# Patient Record
Sex: MALE | Race: WHITE | NOT HISPANIC OR LATINO | Employment: OTHER | URBAN - METROPOLITAN AREA
[De-identification: names, ages, dates, MRNs, and addresses within clinical notes are randomized per-mention and may not be internally consistent; named-entity substitution may affect disease eponyms.]

---

## 2020-01-09 ENCOUNTER — OFFICE VISIT CONVERTED (OUTPATIENT)
Dept: ORTHOPEDIC SURGERY | Facility: CLINIC | Age: 20
End: 2020-01-09
Attending: ORTHOPAEDIC SURGERY

## 2020-02-10 ENCOUNTER — HOSPITAL ENCOUNTER (OUTPATIENT)
Dept: PERIOP | Facility: HOSPITAL | Age: 20
Setting detail: HOSPITAL OUTPATIENT SURGERY
Discharge: HOME | End: 2020-02-10
Attending: ORTHOPAEDIC SURGERY

## 2020-02-25 ENCOUNTER — OFFICE VISIT CONVERTED (OUTPATIENT)
Dept: ORTHOPEDIC SURGERY | Facility: CLINIC | Age: 20
End: 2020-02-25
Attending: ORTHOPAEDIC SURGERY

## 2020-04-23 ENCOUNTER — OFFICE VISIT CONVERTED (OUTPATIENT)
Dept: ORTHOPEDIC SURGERY | Facility: CLINIC | Age: 20
End: 2020-04-23
Attending: ORTHOPAEDIC SURGERY

## 2020-07-23 ENCOUNTER — OFFICE VISIT CONVERTED (OUTPATIENT)
Dept: ORTHOPEDIC SURGERY | Facility: CLINIC | Age: 20
End: 2020-07-23
Attending: PHYSICIAN ASSISTANT

## 2020-08-20 ENCOUNTER — OFFICE VISIT CONVERTED (OUTPATIENT)
Dept: ORTHOPEDIC SURGERY | Facility: CLINIC | Age: 20
End: 2020-08-20
Attending: ORTHOPAEDIC SURGERY

## 2020-08-20 ENCOUNTER — CONVERSION ENCOUNTER (OUTPATIENT)
Dept: ORTHOPEDIC SURGERY | Facility: CLINIC | Age: 20
End: 2020-08-20

## 2020-09-14 ENCOUNTER — HOSPITAL ENCOUNTER (OUTPATIENT)
Dept: MRI IMAGING | Facility: HOSPITAL | Age: 20
Discharge: HOME | End: 2020-09-14
Attending: ORTHOPAEDIC SURGERY

## 2020-09-22 ENCOUNTER — OFFICE VISIT CONVERTED (OUTPATIENT)
Dept: ORTHOPEDIC SURGERY | Facility: CLINIC | Age: 20
End: 2020-09-22
Attending: ORTHOPAEDIC SURGERY

## 2020-09-22 ENCOUNTER — CONVERSION ENCOUNTER (OUTPATIENT)
Dept: ORTHOPEDIC SURGERY | Facility: CLINIC | Age: 20
End: 2020-09-22

## 2020-11-27 ENCOUNTER — HOSPITAL ENCOUNTER (OUTPATIENT)
Dept: PREADMISSION TESTING | Facility: HOSPITAL | Age: 20
Discharge: HOME | End: 2020-11-27
Attending: ORTHOPAEDIC SURGERY

## 2020-11-28 LAB — SARS-COV-2 RNA SPEC QL NAA+PROBE: NOT DETECTED

## 2020-11-30 ENCOUNTER — HOSPITAL ENCOUNTER (OUTPATIENT)
Dept: PERIOP | Facility: HOSPITAL | Age: 20
Setting detail: HOSPITAL OUTPATIENT SURGERY
Discharge: HOME | End: 2020-11-30
Attending: ORTHOPAEDIC SURGERY

## 2020-12-11 ENCOUNTER — CONVERSION ENCOUNTER (OUTPATIENT)
Dept: ORTHOPEDIC SURGERY | Facility: CLINIC | Age: 20
End: 2020-12-11

## 2020-12-11 ENCOUNTER — OFFICE VISIT CONVERTED (OUTPATIENT)
Dept: ORTHOPEDIC SURGERY | Facility: CLINIC | Age: 20
End: 2020-12-11
Attending: ORTHOPAEDIC SURGERY

## 2021-05-12 NOTE — PROGRESS NOTES
Progress Note      Patient Name: Harry Madden   Patient ID: [id number]   Sex: Male   YOB: 2000        Visit Date: April 23, 2020    Provider: Fco Hawkins MD   Location: [facility]   Location Address: [address] [place] [address]  [id number]   Location Phone: [phone]          Chief Complaint  · S/P Left Knee ACL Reconstruction and Meniscus Repair      History Of Present Illness  Harry Madden is a 19 year old /White male who presents today to [facility].      Patient is post-op left knee arthroscopic ACL reconstruction and medial meniscus repair performed on 2/10/2020. Patient states he had no physical therapy for 3 weeks because of COVID-19, but he did start back earlier this week. Patient states he discontinued knee brace on Monday. Denies locking or instability. Patient states he has occasional left knee popping that can be painful as well as lateral left knee tenderness.       Past Medical History  Asthma         Past Surgical History  I have had no surgeries         Medication List  naproxen 250 mg oral tablet         Allergy List  NO KNOWN DRUG ALLERGIES; NO KNOWN DRUG ALLERGIES         Family Medical History  *No Known Family History         Social History  Alcohol Use (Current some day); .; lives with other; Recreational Drug Use (Never); Tobacco (Former); Working         Review of Systems  · Constitutional  o Denies  o : fever, chills, weight loss  · Cardiovascular  o Denies  o : chest pain, shortness of breath  · Gastrointestinal  o Admits  o : heartburn  o Denies  o : liver disease, nausea, blood in stools  · Genitourinary  o Denies  o : painful urination, blood in urine  · Integument  o Denies  o : rash, itching  · Neurologic  o Denies  o : headache, weakness, loss of consciousness  · Musculoskeletal  o Denies  o : painful, swollen joints  · Psychiatric  o Denies  o : drug/alcohol addiction, anxiety,  "depression      Vitals  Date Time BP Position Site L\R Cuff Size HR RR TEMP (F) WT  HT  BMI kg/m2 BSA m2 O2 Sat HC       04/23/2020 10:51 AM         199lbs 0oz 6'  1\" 26.25 2.16           Physical Examination  · Constitutional  o Appearance  o : well developed, well-nourished, no obvious deformities present  · Head and Face  o Head  o :   § Inspection  § : normocephalic  o Face  o :   § Inspection  § : no facial lesions  · Eyes  o Conjunctivae  o : conjunctivae normal  o Sclerae  o : sclerae white  · Ears, Nose, Mouth and Throat  o Ears  o :   § External Ears  § : appearance within normal limits  § Hearing  § : intact  o Nose  o :   § External Nose  § : appearance normal  · Neck  o Inspection/Palpation  o : normal appearance  o Range of Motion  o : full range of motion  · Respiratory  o Respiratory Effort  o : breathing unlabored  o Inspection of Chest  o : normal appearance  o Auscultation of Lungs  o : no audible wheezing or rales  · Cardiovascular  o Heart  o : regular rate  · Gastrointestinal  o Abdominal Examination  o : soft and non-tender  · Skin and Subcutaneous Tissue  o General Inspection  o : intact, no rashes  · Psychiatric  o General  o : Alert and oriented x3  o Judgement and Insight  o : judgment and insight intact  o Mood and Affect  o : mood normal, affect appropriate  · Left Knee  o Inspection  o : Well-healed scars. No signs of infection. Range of motion 0-113. Mild medial joint line tenderness. Negative Lachman and Posterior Sag. Stable to anterior and posterior drawer. Stable to valgus/varus stress. Negative Ruben and Apley's. Neurovascularly intact. Sensation grossly intact.           Assessment  · S/P Left Knee ACL Reconstruction and Meniscus Repair     V54.81  · Left knee pain, unspecified chronicity     719.46/M25.562      Plan  · Medications  o Medications have been Reconciled  o Transition of Care or Provider Policy  · Instructions  o Reviewed the patient's Past Medical, Social, and " Family history as well as the ROS at today's visit, no changes.  o Call or return if worsening symptoms.  o The above service was scribed by Celine Tim on my behalf and I attest to the accuracy of the note. jsb  o The plan is to continue physical therapy. Avoid any pivoting/twisting activities. Follow-up in 3 months.  · Referrals  o ID: [id number] Date: 01/09/2020 Type: Inbound  Specialty: Orthopedic Surgery            Electronically Signed by: Azul Tim - , Other -Author on April 23, 2020 01:09:43 PM  Electronically Co-signed by: Fco Hawkins MD -Reviewer on April 26, 2020 08:52:10 PM

## 2021-05-13 NOTE — PROGRESS NOTES
Progress Note      Patient Name: Harry Madden   Patient ID: [id number]   Sex: Male   YOB: 2000        Visit Date: July 23, 2020    Provider: Semaj Middleton PA-C   Location: [facility]   Location Address: [address] [place] [address]  [id number]   Location Phone: [phone]          Chief Complaint  · S/P Left Knee ACL reconstruction and Meniscus repair      History Of Present Illness  Harry Madden is a 19 year old /White male who presents today to [facility]. Patient presents for follow-up evaluation of left knee arthroscopic ACL reconstruction, medial meniscus repair, 2/10/2020. Patient states he has been doing well he states he has pain with certain activities including doing his [employer] duties of being a , climbing up and onto and under trucks, kneeling causes pain in the left knee, patient states he has remained active, continues physical therapy. Patient states he is improving but states that he had to jacqueline after a dog that was about to hurt someone and he states this caused pain with running in his left knee. Patient states he is doing elliptical and stair climber's with therapy. Patient takes ibuprofen for pain, states it helps somewhat. Patient states he ices the knee when he has pain or when it swells and his symptoms resolve with rest and ice. No new complaints today.       Past Medical History  Asthma         Past Surgical History  I have had no surgeries         Medication List  naproxen 250 mg oral tablet         Allergy List  NO KNOWN DRUG ALLERGIES; NO KNOWN DRUG ALLERGIES       Allergies Reconciled  Family Medical History  *No Known Family History         Social History  Alcohol Use (Current some day); .; lives with other; Recreational Drug Use (Never); Tobacco (Former); Working         Review of Systems  · Constitutional  o Denies  o : fever, chills, weight loss  · Cardiovascular  o Denies  o : chest pain, shortness of  "breath  · Gastrointestinal  o Denies  o : liver disease, heartburn, nausea, blood in stools  · Genitourinary  o Denies  o : painful urination, blood in urine  · Integument  o Denies  o : rash, itching  · Neurologic  o Denies  o : headache, weakness, loss of consciousness  · Musculoskeletal  o Denies  o : painful, swollen joints  · Psychiatric  o Denies  o : drug/alcohol addiction, anxiety, depression      Vitals  Date Time BP Position Site L\R Cuff Size HR RR TEMP (F) WT  HT  BMI kg/m2 BSA m2 O2 Sat        07/23/2020 09:11 AM      79 - R   212lbs 2oz 6'  2\" 27.23 2.24 98 %          Physical Examination  · Constitutional  o Appearance  o : well developed, well-nourished, no obvious deformities present  · Head and Face  o Head  o :   § Inspection  § : normocephalic  o Face  o :   § Inspection  § : no facial lesions  · Eyes  o Conjunctivae  o : conjunctivae normal  o Sclerae  o : sclerae white  · Ears, Nose, Mouth and Throat  o Ears  o :   § External Ears  § : appearance within normal limits  § Hearing  § : intact  o Nose  o :   § External Nose  § : appearance normal  · Neck  o Inspection/Palpation  o : normal appearance  o Range of Motion  o : full range of motion  · Respiratory  o Respiratory Effort  o : breathing unlabored  o Inspection of Chest  o : normal appearance  o Auscultation of Lungs  o : no audible wheezing or rales  · Cardiovascular  o Heart  o : regular rate  · Gastrointestinal  o Abdominal Examination  o : soft and non-tender  · Skin and Subcutaneous Tissue  o General Inspection  o : intact, no rashes  · Psychiatric  o General  o : Alert and oriented x3  o Judgement and Insight  o : judgment and insight intact  o Mood and Affect  o : mood normal, affect appropriate  · Left Knee  o Inspection  o : Incisions are well-healed, nontender to palpation, full extension, flexion 120, stable anterior/posterior drawer, stable to varus/valgus stress, no swelling, no ecchymosis or " erythema.          Assessment  · Aftercare following surgery of left knee arthroscopic ACL reconstruction, medial meniscus repair, 2/10/2020     V54.81  · Left knee pain, unspecified chronicity     719.46/M25.562      Plan  · Medications  o Medications have been Reconciled  o Transition of Care or Provider Policy  · Instructions  o Reviewed the patient's Past Medical, Social, and Family history as well as the ROS at today's visit, no changes.  o Call or return if worsening symptoms.  o Follow up in 3 months.  o Based on history and physical exam patient is advised to continue physical therapy continue activity as tolerated, patient was advised to continue his current profile and will be reevaluated in 3 months. Follow-up in 3 months            Electronically Signed by: COLTEN Noel-C -Author on July 23, 2020 09:33:35 AM  Electronically Co-signed by: Fco Hawkins MD -Reviewer on July 23, 2020 09:55:18 PM

## 2021-05-13 NOTE — PROGRESS NOTES
"   Progress Note      Patient Name: Harry Madden   Patient ID: [id number]   Sex: Male   YOB: 2000        Visit Date: December 11, 2020    Provider: Fco Hawkins MD   Location: [facility]   Location Address: [address] [place] [address]  [id number]   Location Phone: [phone]          Chief Complaint  · Followup left knee arthroscopy 11/30/2020.       History Of Present Illness  Harry Madden is a 20 year old /White male who presents today for followup of a left knee surgery. Overall, he has been doing well. However, he has been wearing a brace some, as his knee feels \"loose\". He has been attending physical therapy. No signs of infection.       Past Medical History  Asthma         Past Surgical History  I have had no surgeries         Medication List  naproxen 250 mg oral tablet         Allergy List  NO KNOWN DRUG ALLERGIES; NO KNOWN DRUG ALLERGIES         Family Medical History  *No Known Family History         Social History  Alcohol Use (Current some day); .; lives with other; Recreational Drug Use (Never); Tobacco (Current every day); Working         Review of Systems  · Constitutional  o Denies  o : fever, chills, weight loss  · Cardiovascular  o Denies  o : chest pain, shortness of breath  · Gastrointestinal  o Denies  o : liver disease, heartburn, nausea, blood in stools  · Genitourinary  o Denies  o : painful urination, blood in urine  · Integument  o Denies  o : rash, itching  · Neurologic  o Denies  o : headache, weakness, loss of consciousness  · Musculoskeletal  o Denies  o : painful, swollen joints  · Psychiatric  o Denies  o : drug/alcohol addiction, anxiety, depression      Vitals  Date Time BP Position Site L\R Cuff Size HR RR TEMP (F) WT  HT  BMI kg/m2 BSA m2 O2 Sat FR L/min FiO2 HC       12/11/2020 09:11 AM      74 - R   214lbs 4oz 6'  1\" 28.27 2.24 98 %            Physical Examination  · Constitutional  o Appearance  o : well developed, " well-nourished, no obvious deformities present  · Head and Face  o Head  o :   § Inspection  § : normocephalic  o Face  o :   § Inspection  § : no facial lesions  · Eyes  o Conjunctivae  o : conjunctivae normal  o Sclerae  o : sclerae white  · Ears, Nose, Mouth and Throat  o Ears  o :   § External Ears  § : appearance within normal limits  § Hearing  § : intact  o Nose  o :   § External Nose  § : appearance normal  · Neck  o Inspection/Palpation  o : normal appearance  o Range of Motion  o : full range of motion  · Respiratory  o Respiratory Effort  o : breathing unlabored  o Inspection of Chest  o : normal appearance  o Auscultation of Lungs  o : no audible wheezing or rales  · Cardiovascular  o Heart  o : regular rate  · Gastrointestinal  o Abdominal Examination  o : soft and non-tender  · Skin and Subcutaneous Tissue  o General Inspection  o : intact, no rashes  · Psychiatric  o General  o : Alert and oriented x3  o Judgement and Insight  o : judgment and insight intact  o Mood and Affect  o : mood normal, affect appropriate  · Left Knee  o Inspection  o : -2 degrees of extension to 120 degrees of flexion. Stable to varus/valgus stress. Stable to anterior/posterior drawer. Negative Lachman. Mild swelling over incision sites. Sutures removed. Incision is well healing.           Assessment  · Aftercare left knee arthroscopic partial medial and lateral meniscectomy 11/30/2020     V54.81  · Left knee pain, unspecified chronicity     719.46/M25.562  · History of Left knee arthroscopic anterior cruciate ligament reconstruction, medial meniscus repair 02/10/2020     V45.89/Z98.890      Plan  · Medications  o Medications have been Reconciled  o Transition of Care or Provider Policy  · Instructions  o Sutures removed in clinic today.  o Reviewed the patient's Past Medical, Social, and Family history as well as the ROS at today's visit, no changes.  o Call or return if worsening symptoms.  o Note transcribed by Gemini  Tanmay. jsb  o Overall, he is doing well. His knee feels stable on exam. His ACL was intact at the time of surgery. Recommended continuing physical therapy, weight bearing as tolerated, knee brace as needed. Follow up in 4 weeks to recheck.   · Referrals  o ID: [id number] Date: 01/09/2020 Type: Inbound  Specialty: Orthopedic Surgery            Electronically Signed by: Gemini Donato-, Other -Author on December 13, 2020 11:43:16 AM  Electronically Co-signed by: Fco Hawkins MD -Reviewer on December 13, 2020 06:03:36 PM

## 2021-05-13 NOTE — PROGRESS NOTES
Progress Note      Patient Name: Harry Madden   Patient ID: [id number]   Sex: Male   YOB: 2000        Visit Date: September 22, 2020    Provider: Fco Hawkins MD   Location: [facility]   Location Address: [address] [place] [address]  [id number]   Location Phone: [phone]          Chief Complaint  · Left knee pain      History Of Present Illness  Harry Madden is a 20 year old /White male who presents today to [facility].      The patient presents here today for follow up evaluation of the left knee. The patient underwent a left knee arthroscopic ACL reconstruction, medial meniscus repair, 2/10/2020. He is currently a  in the army. The patient was overall doing well until about a month ago when he started experiencing buckling of the left knee when walking. He states his pain does increase with activity and he does have some stiffness. We ordered an MRI at his previous visit and he is here to follow up with those results.           Past Medical History  Asthma         Past Surgical History  I have had no surgeries         Medication List  naproxen 250 mg oral tablet         Allergy List  NO KNOWN DRUG ALLERGIES; NO KNOWN DRUG ALLERGIES       Allergies Reconciled  Family Medical History  *No Known Family History         Social History  Alcohol Use (Current some day); .; lives with other; Recreational Drug Use (Never); Tobacco (Current every day); Working         Review of Systems  · Constitutional  o Denies  o : fever, chills, weight loss  · Cardiovascular  o Denies  o : chest pain, shortness of breath  · Gastrointestinal  o Denies  o : liver disease, heartburn, nausea, blood in stools  · Genitourinary  o Denies  o : painful urination, blood in urine  · Integument  o Denies  o : rash, itching  · Neurologic  o Denies  o : headache, weakness, loss of consciousness  · Musculoskeletal  o Denies  o : painful, swollen  "joints  · Psychiatric  o Denies  o : drug/alcohol addiction, anxiety, depression      Vitals  Date Time BP Position Site L\R Cuff Size HR RR TEMP (F) WT  HT  BMI kg/m2 BSA m2 O2 Sat        09/22/2020 09:10 AM      74 - R   222lbs 6oz 6'  2\" 28.55 2.29 99 %          Physical Examination  · Constitutional  o Appearance  o : well developed, well-nourished, no obvious deformities present  · Head and Face  o Head  o :   § Inspection  § : normocephalic  o Face  o :   § Inspection  § : no facial lesions  · Eyes  o Conjunctivae  o : conjunctivae normal  o Sclerae  o : sclerae white  · Ears, Nose, Mouth and Throat  o Ears  o :   § External Ears  § : appearance within normal limits  § Hearing  § : intact  o Nose  o :   § External Nose  § : appearance normal  · Neck  o Inspection/Palpation  o : normal appearance  o Range of Motion  o : full range of motion  · Respiratory  o Respiratory Effort  o : breathing unlabored  o Inspection of Chest  o : normal appearance  o Auscultation of Lungs  o : no audible wheezing or rales  · Cardiovascular  o Heart  o : regular rate  · Gastrointestinal  o Abdominal Examination  o : soft and non-tender  · Skin and Subcutaneous Tissue  o General Inspection  o : intact, no rashes  · Psychiatric  o General  o : Alert and oriented x3  o Judgement and Insight  o : judgment and insight intact  o Mood and Affect  o : mood normal, affect appropriate  · Left Knee  o Inspection  o : Incisions well healed, mild swelling. Positive Tay, Negative Lachman. Tenderness over the medial joint line. Stable to varus and valgus stress. Stable to anterior and posterior drawer   · Imaging  o Imaging  o : [facility] MRI 09/2020: 1. Previous ACL repair without evidence of complication 2. Previous partial medial meniscectomy. A horizontal undersurface tear of the medial meniscal posterior horn is age indeterminate. Correlate with previous surgical findings. 3. Suspected tiny superior and inferior surface tears of the " lateral meniscal posterior horn 4. Small knee joint effusion 5. Mild patellar tendinopathy 6. Hypoplasia of the intertrochlear groove           Assessment  · Left knee pain, unspecified chronicity     719.46/M25.562  · Medial meniscus tear     836.0/S83.249A  · Lateral meniscal tear     836.1/S83.289A      Plan  · Orders  o Tobacco cessation counseling completed (4004F) - - 09/22/2020  · Medications  o Medications have been Reconciled  o Transition of Care or Provider Policy  · Instructions  o MRI reviewed by Dr. Hawkins.  o Reviewed the patient's Past Medical, Social, and Family history as well as the ROS at today's visit, no changes.  o Call or return if worsening symptoms.  o Discussed surgery.  o Risks/benefits discussed with patient including, but not limited to: infection, bleeding, neurovascular damage, malunion, nonunion, aesthetic deformity, need for further surgery, and death.  o Discussed with patient the implant type being used during surgery and patient understands and desires to proceed.  o Surgery pamphlet given.  o The above service was scribed by Josefa Saldaña on my behalf and I attest to the accuracy of the note. jsb  o Discussed treatment options with the patient involving a left knee arthroscopy. Discussed the risks and benefits of a Left knee Arthroscopy. The patient expressed understanding and wished to proceed with a Left knee arthroscopic partial lateral and medial meniscectomy. Follow up 2 weeks post operatively.   o Electronically Identified Patient Education Materials Provided Electronically  · Referrals  o ID: [id number] Date: 01/09/2020 Type: Inbound  Specialty: Orthopedic Surgery            Electronically Signed by: Josefa Saldaña MA -Author on September 22, 2020 09:20:25 AM  Electronically Co-signed by: Fco Hawkins MD -Reviewer on September 22, 2020 09:27:02 PM

## 2021-05-13 NOTE — PROGRESS NOTES
Progress Note      Patient Name: Harry Madden   Patient ID: [id number]   Sex: Male   YOB: 2000        Visit Date: August 20, 2020    Provider: Fco Hawkins MD   Location: [facility]   Location Address: [address] [place] [address]  [id number]   Location Phone: [phone]          Chief Complaint  · left knee pain      History Of Present Illness  Harry Madden is a 19 year old /White male who presents today to [facility].      Patient presents today for left knee pain. He reports a few weeks ago he had buckling of the knee without pain. However, over the last few days he has had buckling and pain. He reports this will buckle with normal walking. Patient denies re-injury. He has been in therapy.  Patient underwent a left knee ACL reconstruction, medial meniscus repair in February 2020.                 Past Medical History  Asthma         Past Surgical History  I have had no surgeries         Medication List  naproxen 250 mg oral tablet         Allergy List  NO KNOWN DRUG ALLERGIES; NO KNOWN DRUG ALLERGIES       Allergies Reconciled  Family Medical History  *No Known Family History         Social History  Alcohol Use (Current some day); .; lives with other; Recreational Drug Use (Never); Tobacco (Current every day); Working         Review of Systems  · Constitutional  o Denies  o : fever, chills, weight loss  · Cardiovascular  o Denies  o : chest pain, shortness of breath  · Gastrointestinal  o Denies  o : liver disease, heartburn, nausea, blood in stools  · Genitourinary  o Denies  o : painful urination, blood in urine  · Integument  o Denies  o : rash, itching  · Neurologic  o Denies  o : headache, weakness, loss of consciousness  · Musculoskeletal  o Denies  o : painful, swollen joints  · Psychiatric  o Denies  o : drug/alcohol addiction, anxiety, depression      Vitals  Date Time BP Position Site L\R Cuff Size HR RR TEMP (F) WT  HT  BMI  "kg/m2 BSA m2 O2 Sat        08/20/2020 03:00 PM      85 - R   209lbs 0oz 6'  2\" 26.83 2.22 98 %          Physical Examination  · Constitutional  o Appearance  o : well developed, well-nourished, no obvious deformities present  · Head and Face  o Head  o :   § Inspection  § : normocephalic  o Face  o :   § Inspection  § : no facial lesions  · Eyes  o Conjunctivae  o : conjunctivae normal  o Sclerae  o : sclerae white  · Ears, Nose, Mouth and Throat  o Ears  o :   § External Ears  § : appearance within normal limits  § Hearing  § : intact  o Nose  o :   § External Nose  § : appearance normal  · Neck  o Inspection/Palpation  o : normal appearance  o Range of Motion  o : full range of motion  · Respiratory  o Respiratory Effort  o : breathing unlabored  o Inspection of Chest  o : normal appearance  o Auscultation of Lungs  o : no audible wheezing or rales  · Cardiovascular  o Heart  o : regular rate  · Gastrointestinal  o Abdominal Examination  o : soft and non-tender  · Skin and Subcutaneous Tissue  o General Inspection  o : intact, no rashes  · Psychiatric  o General  o : Alert and oriented x3  o Judgement and Insight  o : judgment and insight intact  o Mood and Affect  o : mood normal, affect appropriate  · Left Knee  o Inspection  o : Full extension, well-healed scars about the knee secondary to ACL repair, mild laxity to anterior drawer, negative Lachman's, negative Ruben's, Neurovascularly intact, sensation intact, negative pivot-shift, Non-tender to palpation          Assessment  · Left knee pain     719.46/M25.562  status-post ACL and meniscus repair      Plan  · Orders  o Tobacco cessation counseling completed (8654F) - - 08/21/2020  · Medications  o Medications have been Reconciled  o Transition of Care or Provider Policy  · Instructions  o Reviewed the patient's Past Medical, Social, and Family history as well as the ROS at today's visit, no changes.  o Call or return if worsening symptoms.  o The above " service was scribed by Jennifer Snyder on my behalf and I attest to the accuracy of the note. jsb  o We recommended an MRI to evaluate the quality and healing of the graft since he has a recurrence of pain and buckling of the knee with mild laxity on exam. Follow-up after MRI.   · Referrals  o ID: [id number] Date: 01/09/2020 Type: Inbound  Specialty: Orthopedic Surgery            Electronically Signed by: Jennifer Snyder-, Other -Author on August 21, 2020 06:33:17 AM  Electronically Co-signed by: Fco Hawkins MD -Reviewer on August 23, 2020 09:26:14 PM

## 2021-05-14 VITALS — BODY MASS INDEX: 26.82 KG/M2 | HEIGHT: 74 IN | HEART RATE: 85 BPM | OXYGEN SATURATION: 98 % | WEIGHT: 209 LBS

## 2021-05-14 VITALS — HEART RATE: 74 BPM | WEIGHT: 214.25 LBS | BODY MASS INDEX: 28.39 KG/M2 | HEIGHT: 73 IN | OXYGEN SATURATION: 98 %

## 2021-05-14 VITALS — HEIGHT: 74 IN | WEIGHT: 222.37 LBS | OXYGEN SATURATION: 99 % | BODY MASS INDEX: 28.54 KG/M2 | HEART RATE: 74 BPM

## 2021-05-15 VITALS — BODY MASS INDEX: 25.54 KG/M2 | WEIGHT: 199 LBS | HEIGHT: 74 IN | OXYGEN SATURATION: 98 % | HEART RATE: 78 BPM

## 2021-05-15 VITALS — HEIGHT: 74 IN | BODY MASS INDEX: 27.22 KG/M2 | WEIGHT: 212.12 LBS | OXYGEN SATURATION: 98 % | HEART RATE: 79 BPM

## 2021-05-15 VITALS — WEIGHT: 199 LBS | BODY MASS INDEX: 26.37 KG/M2 | HEIGHT: 73 IN

## 2021-05-15 VITALS — HEART RATE: 78 BPM | HEIGHT: 73 IN | OXYGEN SATURATION: 98 %

## 2022-04-05 ENCOUNTER — APPOINTMENT (OUTPATIENT)
Dept: GENERAL RADIOLOGY | Facility: HOSPITAL | Age: 22
End: 2022-04-05

## 2022-04-05 ENCOUNTER — HOSPITAL ENCOUNTER (EMERGENCY)
Facility: HOSPITAL | Age: 22
Discharge: HOME | End: 2022-04-05
Attending: EMERGENCY MEDICINE | Admitting: EMERGENCY MEDICINE

## 2022-04-05 VITALS
SYSTOLIC BLOOD PRESSURE: 126 MMHG | HEART RATE: 50 BPM | TEMPERATURE: 97.8 F | OXYGEN SATURATION: 100 % | DIASTOLIC BLOOD PRESSURE: 90 MMHG | RESPIRATION RATE: 16 BRPM | WEIGHT: 210.98 LBS | BODY MASS INDEX: 27.08 KG/M2 | HEIGHT: 74 IN

## 2022-04-05 DIAGNOSIS — J11.1 INFLUENZA: Primary | ICD-10-CM

## 2022-04-05 DIAGNOSIS — T50.905A ADVERSE EFFECT OF DRUG, INITIAL ENCOUNTER: ICD-10-CM

## 2022-04-05 LAB
ALBUMIN SERPL-MCNC: 4.6 G/DL (ref 3.5–5.2)
ALBUMIN/GLOB SERPL: 1.9 G/DL
ALP SERPL-CCNC: 49 U/L (ref 39–117)
ALT SERPL W P-5'-P-CCNC: 52 U/L (ref 1–41)
ANION GAP SERPL CALCULATED.3IONS-SCNC: 9.5 MMOL/L (ref 5–15)
AST SERPL-CCNC: 37 U/L (ref 1–40)
BASOPHILS # BLD AUTO: 0.03 10*3/MM3 (ref 0–0.2)
BASOPHILS NFR BLD AUTO: 0.5 % (ref 0–1.5)
BILIRUB SERPL-MCNC: 0.4 MG/DL (ref 0–1.2)
BUN SERPL-MCNC: 13 MG/DL (ref 6–20)
BUN/CREAT SERPL: 12.1 (ref 7–25)
CALCIUM SPEC-SCNC: 8.9 MG/DL (ref 8.6–10.5)
CHLORIDE SERPL-SCNC: 106 MMOL/L (ref 98–107)
CO2 SERPL-SCNC: 26.5 MMOL/L (ref 22–29)
CREAT SERPL-MCNC: 1.07 MG/DL (ref 0.76–1.27)
DEPRECATED RDW RBC AUTO: 40 FL (ref 37–54)
EGFRCR SERPLBLD CKD-EPI 2021: 100 ML/MIN/1.73
EOSINOPHIL # BLD AUTO: 0.08 10*3/MM3 (ref 0–0.4)
EOSINOPHIL NFR BLD AUTO: 1.2 % (ref 0.3–6.2)
ERYTHROCYTE [DISTWIDTH] IN BLOOD BY AUTOMATED COUNT: 12.8 % (ref 12.3–15.4)
GLOBULIN UR ELPH-MCNC: 2.4 GM/DL
GLUCOSE SERPL-MCNC: 101 MG/DL (ref 65–99)
HCT VFR BLD AUTO: 43 % (ref 37.5–51)
HGB BLD-MCNC: 14.2 G/DL (ref 13–17.7)
HOLD SPECIMEN: NORMAL
HOLD SPECIMEN: NORMAL
IMM GRANULOCYTES # BLD AUTO: 0.02 10*3/MM3 (ref 0–0.05)
IMM GRANULOCYTES NFR BLD AUTO: 0.3 % (ref 0–0.5)
LYMPHOCYTES # BLD AUTO: 1.91 10*3/MM3 (ref 0.7–3.1)
LYMPHOCYTES NFR BLD AUTO: 29.1 % (ref 19.6–45.3)
MCH RBC QN AUTO: 28.2 PG (ref 26.6–33)
MCHC RBC AUTO-ENTMCNC: 33 G/DL (ref 31.5–35.7)
MCV RBC AUTO: 85.5 FL (ref 79–97)
MONOCYTES # BLD AUTO: 0.39 10*3/MM3 (ref 0.1–0.9)
MONOCYTES NFR BLD AUTO: 5.9 % (ref 5–12)
NEUTROPHILS NFR BLD AUTO: 4.14 10*3/MM3 (ref 1.7–7)
NEUTROPHILS NFR BLD AUTO: 63 % (ref 42.7–76)
NRBC BLD AUTO-RTO: 0 /100 WBC (ref 0–0.2)
PLATELET # BLD AUTO: 206 10*3/MM3 (ref 140–450)
PMV BLD AUTO: 10.4 FL (ref 6–12)
POTASSIUM SERPL-SCNC: 4.2 MMOL/L (ref 3.5–5.2)
PROT SERPL-MCNC: 7 G/DL (ref 6–8.5)
RBC # BLD AUTO: 5.03 10*6/MM3 (ref 4.14–5.8)
SODIUM SERPL-SCNC: 142 MMOL/L (ref 136–145)
WBC NRBC COR # BLD: 6.57 10*3/MM3 (ref 3.4–10.8)
WHOLE BLOOD HOLD SPECIMEN: NORMAL
WHOLE BLOOD HOLD SPECIMEN: NORMAL

## 2022-04-05 PROCEDURE — 93005 ELECTROCARDIOGRAM TRACING: CPT | Performed by: EMERGENCY MEDICINE

## 2022-04-05 PROCEDURE — 93005 ELECTROCARDIOGRAM TRACING: CPT

## 2022-04-05 PROCEDURE — 85025 COMPLETE CBC W/AUTO DIFF WBC: CPT

## 2022-04-05 PROCEDURE — 80053 COMPREHEN METABOLIC PANEL: CPT

## 2022-04-05 PROCEDURE — 93010 ELECTROCARDIOGRAM REPORT: CPT | Performed by: SPECIALIST

## 2022-04-05 PROCEDURE — 71046 X-RAY EXAM CHEST 2 VIEWS: CPT

## 2022-04-05 PROCEDURE — 25010000002 KETOROLAC TROMETHAMINE PER 15 MG: Performed by: EMERGENCY MEDICINE

## 2022-04-05 PROCEDURE — 96374 THER/PROPH/DIAG INJ IV PUSH: CPT

## 2022-04-05 PROCEDURE — 96375 TX/PRO/DX INJ NEW DRUG ADDON: CPT

## 2022-04-05 PROCEDURE — 25010000002 ONDANSETRON PER 1 MG: Performed by: EMERGENCY MEDICINE

## 2022-04-05 PROCEDURE — 36415 COLL VENOUS BLD VENIPUNCTURE: CPT

## 2022-04-05 PROCEDURE — 99283 EMERGENCY DEPT VISIT LOW MDM: CPT

## 2022-04-05 RX ORDER — NAPROXEN 500 MG/1
500 TABLET ORAL 2 TIMES DAILY WITH MEALS
Qty: 14 TABLET | Refills: 0 | Status: SHIPPED | OUTPATIENT
Start: 2022-04-05

## 2022-04-05 RX ORDER — ONDANSETRON 4 MG/1
4 TABLET, ORALLY DISINTEGRATING ORAL 4 TIMES DAILY PRN
Qty: 9 TABLET | Refills: 0 | Status: SHIPPED | OUTPATIENT
Start: 2022-04-05 | End: 2023-03-25

## 2022-04-05 RX ORDER — ONDANSETRON 2 MG/ML
4 INJECTION INTRAMUSCULAR; INTRAVENOUS ONCE
Status: COMPLETED | OUTPATIENT
Start: 2022-04-05 | End: 2022-04-05

## 2022-04-05 RX ORDER — SODIUM CHLORIDE 0.9 % (FLUSH) 0.9 %
10 SYRINGE (ML) INJECTION AS NEEDED
Status: DISCONTINUED | OUTPATIENT
Start: 2022-04-05 | End: 2022-04-06 | Stop reason: HOSPADM

## 2022-04-05 RX ORDER — KETOROLAC TROMETHAMINE 30 MG/ML
30 INJECTION, SOLUTION INTRAMUSCULAR; INTRAVENOUS ONCE
Status: COMPLETED | OUTPATIENT
Start: 2022-04-05 | End: 2022-04-05

## 2022-04-05 RX ADMIN — ONDANSETRON 4 MG: 2 INJECTION INTRAMUSCULAR; INTRAVENOUS at 21:50

## 2022-04-05 RX ADMIN — SODIUM CHLORIDE 1000 ML: 9 INJECTION, SOLUTION INTRAVENOUS at 21:50

## 2022-04-05 RX ADMIN — KETOROLAC TROMETHAMINE 30 MG: 30 INJECTION, SOLUTION INTRAMUSCULAR; INTRAVENOUS at 21:50

## 2022-04-05 NOTE — ED TRIAGE NOTES
"Pt to ED from home with reports of not being able to feel face, \"my whole body is tingly and I'm having some pretty bad chest pain and it's hard to breathe and I haven't been able to sleep well and my girlfriend said I needed to come to the ED because it's a side effect of my tamiflu\".      Pt flu positive, was seen at Urgent Care on Saturday.  "

## 2022-04-06 LAB — QT INTERVAL: 405 MS

## 2022-04-06 NOTE — ED PROVIDER NOTES
Time: 9:02 PM EDT  Arrived by: private car  Chief Complaint: Chest pain    History of Present Illness:  Patient is a 21 y.o. year old male that presents to the emergency department with chest pain    Patient was recently diagnosed with influenza and started on Tamiflu 3 days ago.  Today presents with chest pain.  Patient states he is experiencing flulike symptoms since Thursday 5 days ago.  He was seen in urgent care on Saturday diagnosed with influenza and started on Tamiflu.  Since that time the symptoms have worsened with chest pain shortness of breath increased nausea decreased appetite and dizziness with standing.  He has had chest pain across his anterior chest with no radiation.  He has tried no treatment prior to arrival other than Tamiflu.      History provided by:  Patient  Chest Pain  Pain location:  Substernal area, L chest and R chest  Pain quality: aching    Pain radiates to:  Does not radiate  Pain severity:  Mild  Onset quality:  Gradual  Timing:  Intermittent  Progression:  Waxing and waning  Chronicity:  New  Context: not breathing, not movement and not at rest    Relieved by:  Nothing  Worsened by:  Nothing  Ineffective treatments:  None tried  Associated symptoms: fatigue, fever and nausea    Associated symptoms: no abdominal pain, no cough, no headache, no shortness of breath and no vomiting        Similar Symptoms Previously: No  Recently seen: Yes at urgent care      Patient Care Team  Primary Care Provider: Provider, No Known    Past Medical History:     No Known Allergies  No past medical history on file.  Past Surgical History:   Procedure Laterality Date   • KNEE SURGERY Left      No family history on file.    Home Medications:  Prior to Admission medications    Medication Sig Start Date End Date Taking? Authorizing Provider   brompheniramine-pseudoephedrine-DM 30-2-10 MG/5ML syrup Take 10 mL by mouth 4 (Four) Times a Day As Needed for Congestion, Cough or Allergies. 4/2/22   Fátima  "COLTEN Dockery   fluticasone (FLONASE) 50 MCG/ACT nasal spray 2 sprays into the nostril(s) as directed by provider Daily. 4/2/22   Sarkis Shine PA   oseltamivir (Tamiflu) 75 MG capsule Take 1 capsule by mouth 2 (Two) Times a Day for 5 days. 4/2/22 4/7/22  Sarkis Shine PA        Social History:   Social History     Tobacco Use   • Smoking status: Former Smoker   • Smokeless tobacco: Never Used   Vaping Use   • Vaping Use: Never used   Substance Use Topics   • Alcohol use: Yes     Comment: socially       Record Review:  I have reviewed the patient's records in [facility].     Review of Systems:  Review of Systems   Constitutional: Positive for activity change, appetite change, fatigue and fever. Negative for chills.   HENT: Negative for congestion, ear pain and sore throat.    Eyes: Negative for pain.   Respiratory: Negative for cough, chest tightness and shortness of breath.    Cardiovascular: Positive for chest pain.   Gastrointestinal: Positive for nausea. Negative for abdominal pain, diarrhea and vomiting.   Genitourinary: Negative for flank pain and hematuria.   Musculoskeletal: Positive for myalgias. Negative for joint swelling.   Skin: Negative for pallor.   Neurological: Negative for seizures and headaches.   All other systems reviewed and are negative.       Physical Exam:  /82   Pulse 59   Temp 97.8 °F (36.6 °C) (Oral)   Resp 16   Ht 188 cm (74\")   Wt 95.7 kg (210 lb 15.7 oz)   SpO2 98%   BMI 27.09 kg/m²     Physical Exam  Vitals and nursing note reviewed.   Constitutional:       General: He is not in acute distress.     Appearance: Normal appearance. He is ill-appearing. He is not toxic-appearing.   HENT:      Head: Normocephalic and atraumatic.      Jaw: There is normal jaw occlusion.   Eyes:      General: Lids are normal.      Extraocular Movements: Extraocular movements intact.      Conjunctiva/sclera: Conjunctivae normal.      Pupils: Pupils are equal, round, and reactive to light. "   Cardiovascular:      Rate and Rhythm: Normal rate and regular rhythm.      Pulses: Normal pulses.      Heart sounds: Normal heart sounds.   Pulmonary:      Effort: Pulmonary effort is normal. No respiratory distress.      Breath sounds: Normal breath sounds. No wheezing or rhonchi.   Abdominal:      General: Abdomen is flat.      Palpations: Abdomen is soft.      Tenderness: There is no abdominal tenderness. There is no guarding or rebound.   Musculoskeletal:         General: Normal range of motion.      Cervical back: Normal range of motion and neck supple.      Right lower leg: No edema.      Left lower leg: No edema.   Skin:     General: Skin is warm and dry.   Neurological:      Mental Status: He is alert and oriented to person, place, and time. Mental status is at baseline.   Psychiatric:         Mood and Affect: Mood normal.                Medications in the Emergency Department:  Medications   sodium chloride 0.9 % flush 10 mL (has no administration in time range)   sodium chloride 0.9 % bolus 1,000 mL (1,000 mL Intravenous New Bag 4/5/22 2150)   ondansetron (ZOFRAN) injection 4 mg (4 mg Intravenous Given 4/5/22 2150)   ketorolac (TORADOL) injection 30 mg (30 mg Intravenous Given 4/5/22 2150)        Labs  Lab Results (last 24 hours)     Procedure Component Value Units Date/Time    CBC & Differential [[id number]]  (Normal) Collected: 04/05/22 1945    Specimen: Blood Updated: 04/05/22 2022    Narrative:      The following orders were created for panel order CBC & Differential.  Procedure                               Abnormality         Status                     ---------                               -----------         ------                     CBC Auto Differential[[id number]]        Normal              Final result                 Please view results for these tests on the individual orders.    Comprehensive Metabolic Panel [[id number]]  (Abnormal) Collected: 04/05/22 1945    Specimen: Blood Updated:  04/05/22 2036     Glucose 101 mg/dL      BUN 13 mg/dL      Creatinine 1.07 mg/dL      Sodium 142 mmol/L      Potassium 4.2 mmol/L      Chloride 106 mmol/L      CO2 26.5 mmol/L      Calcium 8.9 mg/dL      Total Protein 7.0 g/dL      Albumin 4.60 g/dL      ALT (SGPT) 52 U/L      AST (SGOT) 37 U/L      Alkaline Phosphatase 49 U/L      Total Bilirubin 0.4 mg/dL      Globulin 2.4 gm/dL      A/G Ratio 1.9 g/dL      BUN/Creatinine Ratio 12.1     Anion Gap 9.5 mmol/L      eGFR 100.0 mL/min/1.73      Comment: National Kidney Foundation and American Society of Nephrology (ASN) Task Force recommended calculation based on the Chronic Kidney Disease Epidemiology Collaboration (CKD-EPI) equation refit without adjustment for race.       Narrative:      GFR Normal >60  Chronic Kidney Disease <60  Kidney Failure <15      CBC Auto Differential [[id number]]  (Normal) Collected: 04/05/22 1945    Specimen: Blood Updated: 04/05/22 2022     WBC 6.57 10*3/mm3      RBC 5.03 10*6/mm3      Hemoglobin 14.2 g/dL      Hematocrit 43.0 %      MCV 85.5 fL      MCH 28.2 pg      MCHC 33.0 g/dL      RDW 12.8 %      RDW-SD 40.0 fl      MPV 10.4 fL      Platelets 206 10*3/mm3      Neutrophil % 63.0 %      Lymphocyte % 29.1 %      Monocyte % 5.9 %      Eosinophil % 1.2 %      Basophil % 0.5 %      Immature Grans % 0.3 %      Neutrophils, Absolute 4.14 10*3/mm3      Lymphocytes, Absolute 1.91 10*3/mm3      Monocytes, Absolute 0.39 10*3/mm3      Eosinophils, Absolute 0.08 10*3/mm3      Basophils, Absolute 0.03 10*3/mm3      Immature Grans, Absolute 0.02 10*3/mm3      nRBC 0.0 /100 WBC            Imaging:  XR Chest 2 View    Result Date: 4/5/2022  PROCEDURE: XR CHEST 2 VW  COMPARISON: None  INDICATIONS: flu positive, cough, chest pain, shortness of breath  FINDINGS:  The cardiomediastinal silhouette is within normal limits.  Pulmonary vascularity appears normal.  There is no focal airspace consolidation, pleural effusion, or pneumothorax.  There are no acute  osseous findings of the thorax.        1. No acute cardiopulmonary abnormality.      CARMEL BEDOYA MD       Electronically Signed and Approved By: CARMEL BEDOYA MD on 4/05/2022 at 20:47               Procedures:  Procedures    Progress  ED Course as of 04/05/22 2153   Tue Apr 05, 2022 2103 EKG: Sinus rhythm 55, normal P wave, normal QRS, normal ST segment, normal QT, no comparison. [JS]      ED Course User Index  [JS] Brad Kendall MD                            Medical Decision Making:  MDM  Number of Diagnoses or Management Options  Diagnosis management comments: Patient with recent influenza diagnosis currently being treated with Tamiflu.  I believe the patient's symptoms are largely related to Tamiflu rather than his viral illness.  We discussed stopping Tamiflu and will continue treatment with symptomatic therapies for nausea and anti-inflammatories.    The patient´s CBC was reviewed and shows no abnormalities of critical concern. Of note, there is no anemia requiring a blood transfusion and the platelet count is acceptable.  The patient´s CMP was reviewed and shows no abnormalities of critical concern. Of note, the patient´s sodium and potassium are acceptable. The patient´s liver enzymes are unremarkable. The patient´s renal function (creatinine) is preserved. The patient has a normal anion gap.  ECG is nonischemic.  Chest x-ray shows no acute cardiopulmonary process.    We discussed return precautions including worsening symptoms or any additional concerns.       Final diagnoses:   Influenza   Adverse effect of drug, initial encounter        Disposition:  ED Disposition     ED Disposition   Discharge    Condition   Stable    Comment   --             Dictated Utilizing Dragon Dictation    Documentation assistance provided by Brad Kendall MD acting as scribe for Brad Kendall MD. Information recorded by the scribe was done at my direction and has been verified and validated by me.        Enoch  MD Brad  04/06/22 0738

## 2022-10-04 PROCEDURE — 87081 CULTURE SCREEN ONLY: CPT

## 2023-03-25 ENCOUNTER — HOSPITAL ENCOUNTER (EMERGENCY)
Facility: HOSPITAL | Age: 23
Discharge: HOME | End: 2023-03-25
Attending: EMERGENCY MEDICINE | Admitting: EMERGENCY MEDICINE
Payer: OTHER GOVERNMENT

## 2023-03-25 VITALS
RESPIRATION RATE: 16 BRPM | TEMPERATURE: 98.5 F | BODY MASS INDEX: 29.71 KG/M2 | SYSTOLIC BLOOD PRESSURE: 136 MMHG | WEIGHT: 231.48 LBS | DIASTOLIC BLOOD PRESSURE: 73 MMHG | HEIGHT: 74 IN | HEART RATE: 106 BPM | OXYGEN SATURATION: 100 %

## 2023-03-25 DIAGNOSIS — J20.9 ACUTE BRONCHITIS, UNSPECIFIED ORGANISM: Primary | ICD-10-CM

## 2023-03-25 LAB
FLUAV AG NPH QL: NEGATIVE
FLUBV AG NPH QL IA: NEGATIVE
S PYO AG THROAT QL: NEGATIVE
SARS-COV-2 RNA RESP QL NAA+PROBE: DETECTED

## 2023-03-25 PROCEDURE — 99283 EMERGENCY DEPT VISIT LOW MDM: CPT

## 2023-03-25 PROCEDURE — 87880 STREP A ASSAY W/OPTIC: CPT | Performed by: EMERGENCY MEDICINE

## 2023-03-25 PROCEDURE — 87147 CULTURE TYPE IMMUNOLOGIC: CPT | Performed by: EMERGENCY MEDICINE

## 2023-03-25 PROCEDURE — C9803 HOPD COVID-19 SPEC COLLECT: HCPCS | Performed by: EMERGENCY MEDICINE

## 2023-03-25 PROCEDURE — 87081 CULTURE SCREEN ONLY: CPT | Performed by: EMERGENCY MEDICINE

## 2023-03-25 PROCEDURE — U0004 COV-19 TEST NON-CDC HGH THRU: HCPCS | Performed by: EMERGENCY MEDICINE

## 2023-03-25 PROCEDURE — 87804 INFLUENZA ASSAY W/OPTIC: CPT | Performed by: EMERGENCY MEDICINE

## 2023-03-25 RX ORDER — AZITHROMYCIN 250 MG/1
TABLET, FILM COATED ORAL
Qty: 6 TABLET | Refills: 0 | Status: SHIPPED | OUTPATIENT
Start: 2023-03-25

## 2023-03-25 RX ORDER — PREDNISONE 20 MG/1
TABLET ORAL
Qty: 15 TABLET | Refills: 0 | Status: SHIPPED | OUTPATIENT
Start: 2023-03-25

## 2023-03-25 NOTE — DISCHARGE INSTRUCTIONS
Drink plenty fluids.  Take medications as directed.  You can also take Mucinex DM over-the-counter.  Take Tylenol or ibuprofen as needed for fever.  Return for worsening symptoms.  Follow-up with your provider in 2 days if no better.  You will be called if your COVID test is positive.

## 2023-03-25 NOTE — ED PROVIDER NOTES
Time: 10:41 AM EDT  Date of encounter:  3/25/2023  Independent Historian/Clinical History and Information was obtained by:   Patient  Chief Complaint: Sore throat, cough    History is limited by: N/A     History of Present Illness:  Patient is a 22 y.o. year old male who presents to the emergency department for evaluation of sore throat. Pt reports sore throat started 3 days ago. Pt reports chills, cough, and myalgias followed. Pt denies fever. Pt reports it worsened this morning. Pt denies vomiting and diarrhea. Pt reports some productive cough.     HPI    Patient Care Team  Primary Care Provider: Provider, No Known    Past Medical History:     No Known Allergies  History reviewed. No pertinent past medical history.  Past Surgical History:   Procedure Laterality Date   • KNEE SURGERY Left      History reviewed. No pertinent family history.    Home Medications:  Prior to Admission medications    Medication Sig Start Date End Date Taking? Authorizing Provider   fluticasone (FLONASE) 50 MCG/ACT nasal spray 2 sprays into the nostril(s) as directed by provider Daily. 4/2/22   Sarkis Shine PA   naproxen (EC NAPROSYN) 500 MG EC tablet Take 1 tablet by mouth 2 (Two) Times a Day With Meals. 4/5/22   Brad Kendall MD   azithromycin (Zithromax Z-Taqueria) 250 MG tablet Take 2 tablets by mouth on day 1, then 1 tablet daily on days 2-5 10/4/22 3/25/23  Claudia Perez APRN   ondansetron ODT (ZOFRAN-ODT) 4 MG disintegrating tablet Place 1 tablet on the tongue 4 (Four) Times a Day As Needed for Nausea or Vomiting. 4/5/22 3/25/23  Brad Kendall MD        Social History:   Social History     Tobacco Use   • Smoking status: Former   • Smokeless tobacco: Never   Vaping Use   • Vaping Use: Never used   Substance Use Topics   • Alcohol use: Yes     Comment: socially   • Drug use: Never         Review of Systems:  Review of Systems   Constitutional: Positive for chills. Negative for activity change, fatigue and unexpected weight  "change.   HENT: Positive for sore throat. Negative for congestion, sinus pressure and trouble swallowing.    Eyes: Negative for pain, discharge, redness and visual disturbance.   Respiratory: Positive for cough. Negative for chest tightness, shortness of breath and wheezing.    Cardiovascular: Negative for chest pain and palpitations.   Gastrointestinal: Negative for abdominal pain, diarrhea, nausea and vomiting.   Endocrine: Negative for cold intolerance and polydipsia.   Genitourinary: Negative for dysuria, frequency, hematuria and urgency.   Musculoskeletal: Positive for myalgias. Negative for arthralgias, joint swelling, neck pain and neck stiffness.   Skin: Negative for color change and rash.   Allergic/Immunologic: Negative for environmental allergies and immunocompromised state.   Neurological: Negative for dizziness, weakness and light-headedness.   Hematological: Does not bruise/bleed easily.   Psychiatric/Behavioral: Negative for agitation, confusion, dysphoric mood and suicidal ideas.        Physical Exam:  /73 (BP Location: Right arm, Patient Position: Sitting)   Pulse 106   Temp 98.5 °F (36.9 °C) (Oral)   Resp 16   Ht 188 cm (74\")   Wt 105 kg (231 lb 7.7 oz)   SpO2 100%   BMI 29.72 kg/m²     Physical Exam  Vitals and nursing note reviewed.   Constitutional:       General: He is not in acute distress.     Appearance: Normal appearance. He is not toxic-appearing.   HENT:      Head: Normocephalic and atraumatic.      Jaw: There is normal jaw occlusion.      Right Ear: Tympanic membrane normal.      Left Ear: Tympanic membrane normal.      Mouth/Throat:      Mouth: Mucous membranes are moist.      Tonsils: No tonsillar exudate or tonsillar abscesses.   Eyes:      General: Lids are normal.      Extraocular Movements: Extraocular movements intact.      Conjunctiva/sclera: Conjunctivae normal.      Pupils: Pupils are equal, round, and reactive to light.   Cardiovascular:      Rate and Rhythm: " Normal rate and regular rhythm.      Pulses: Normal pulses.      Heart sounds: Normal heart sounds.   Pulmonary:      Effort: Pulmonary effort is normal. No respiratory distress.      Breath sounds: Normal breath sounds. No wheezing or rhonchi.   Abdominal:      General: Abdomen is flat.      Palpations: Abdomen is soft.      Tenderness: There is no abdominal tenderness. There is no guarding or rebound.   Musculoskeletal:         General: Normal range of motion.      Cervical back: Normal range of motion and neck supple.      Right lower leg: No edema.      Left lower leg: No edema.   Skin:     General: Skin is warm and dry.      Capillary Refill: Capillary refill takes less than 2 seconds.   Neurological:      Mental Status: He is alert and oriented to person, place, and time. Mental status is at baseline.   Psychiatric:         Mood and Affect: Mood normal.                  Procedures:  Procedures      Medical Decision Making:      Comorbidities that affect care:    None    External Notes reviewed:    Previous Clinic Note: UC visit for sinusitus      The following orders were placed and all results were independently analyzed by me:  Orders Placed This Encounter   Procedures   • Influenza Antigen, Rapid - Swab, Nasopharynx   • Rapid Strep A Screen - Swab, Throat   • Beta Strep Culture, Throat - Swab, Throat       Medications Given in the Emergency Department:  Medications - No data to display     ED Course:         Labs:    Labs Reviewed   COVID-19,APTIMA PANTHER ([facility], NP/OP SWAB IN UTM/VTM/[facility] [employer]SPORT MEDIA,24 HR TAT - Abnormal; Notable for the following components:       Result Value    COVID19 Detected (*)     All other components within normal limits    Narrative:     Fact sheet for providers: https://www.fda.gov/media/656474/download     Fact sheet for patients: https://www.fda.gov/media/143909/download    Test performed by RT PCR.   INFLUENZA ANTIGEN, RAPID - Normal   RAPID STREP A SCREEN  - Normal   BETA HEMOLYTIC STREP CULTURE, THROAT - Normal    Narrative:     Group A Strep incidence is low in adults. Positive culture for Beta hemolytic Streptococcus species can reflect colonization and not true infection. Please correlate clinically.         Lab Results (last 24 hours)     ** No results found for the last 24 hours. **           Imaging:  No results found.      No Radiology Exams Resulted Within Past 24 Hours      Differential Diagnosis and Discussion:    Cough: Differential diagnosis includes but is not limited to pneumonia, acute bronchitis, upper respiratory infection, ACE inhibitor use, allergic reaction, epiglottitis, seasonal allergies, chemical irritants, exercise-induced asthma, viral syndrome.  Sore Throat: Differential diagnosis includes but is not limited to bacterial infection, viral infection, inhaled irritants, sinus drainage, thyroiditis, epiglottitis, and retropharyngeal abscess.    All labs were reviewed and interpreted by me.    MDM  Number of Diagnoses or Management Options     Amount and/or Complexity of Data Reviewed  Clinical lab tests: reviewed             Patient Care Considerations:    CHEST X-RAY: I considered ordering a chest x-ray however the patient's lungs are clear      Consultants/Shared Management Plan:    None    Social Determinants of Health:    Patient is independent, reliable, and has access to care.       Disposition and Care Coordination:    Discharged: The patient is suitable and stable for discharge with no need for consideration of observation or admission.    I have explained discharge medications and the need for follow up with the patient/caretakers. This was also printed in the discharge instructions. Patient was discharged with the following medications and follow up:      Medication List      New Prescriptions    azithromycin 250 MG tablet  Commonly known as: Zithromax Z-Taqueria  Take 2 tablets by mouth on day 1, then 1 tablet daily on days 2-5      predniSONE 20 MG tablet  Commonly known as: DELTASONE  Take 3 p.o. daily for 5 days.           Where to Get Your Medications      These medications were sent to [facility]/pharmacy #[id number] - [place], [place] - [address] - [phone]  - [phone]  [address] [place] [address]    Hours: 24-hours Phone: [phone]   · azithromycin 250 MG tablet  · predniSONE 20 MG tablet      Your primary care provider    In 2 days  If no better.       Final diagnoses:   Acute bronchitis, unspecified organism        ED Disposition     ED Disposition   Discharge    Condition   Stable    Comment   --             This medical record created using voice recognition software.        Documentation assistance provided by Alfonso Blackman acting as scribe for No att. providers found. Information recorded by the scribe was done at my direction and has been verified and validated by me.          Alfonso Blackman  03/25/23 1045       Babatunde Daily DO  03/27/23 0974

## 2023-03-27 LAB — BACTERIA SPEC AEROBE CULT: ABNORMAL
